# Patient Record
Sex: FEMALE | Race: WHITE | NOT HISPANIC OR LATINO
[De-identification: names, ages, dates, MRNs, and addresses within clinical notes are randomized per-mention and may not be internally consistent; named-entity substitution may affect disease eponyms.]

---

## 2017-08-22 ENCOUNTER — RESULT REVIEW (OUTPATIENT)
Age: 42
End: 2017-08-22

## 2018-10-26 ENCOUNTER — RESULT REVIEW (OUTPATIENT)
Age: 43
End: 2018-10-26

## 2020-07-15 ENCOUNTER — RESULT REVIEW (OUTPATIENT)
Age: 45
End: 2020-07-15

## 2020-09-08 ENCOUNTER — RESULT REVIEW (OUTPATIENT)
Age: 45
End: 2020-09-08

## 2022-05-25 ENCOUNTER — APPOINTMENT (OUTPATIENT)
Dept: ORTHOPEDIC SURGERY | Facility: CLINIC | Age: 47
End: 2022-05-25
Payer: COMMERCIAL

## 2022-05-25 DIAGNOSIS — S82.65XB: ICD-10-CM

## 2022-05-25 DIAGNOSIS — J45.909 UNSPECIFIED ASTHMA, UNCOMPLICATED: ICD-10-CM

## 2022-05-25 DIAGNOSIS — S82.851A DISPLACED TRIMALLEOLAR FRACTURE OF RIGHT LOWER LEG, INITIAL ENCOUNTER FOR CLOSED FRACTURE: ICD-10-CM

## 2022-05-25 PROBLEM — Z00.00 ENCOUNTER FOR PREVENTIVE HEALTH EXAMINATION: Status: ACTIVE | Noted: 2022-05-25

## 2022-05-25 PROCEDURE — L4361: CPT

## 2022-05-25 PROCEDURE — 99215 OFFICE O/P EST HI 40 MIN: CPT

## 2022-05-25 PROCEDURE — 99205 OFFICE O/P NEW HI 60 MIN: CPT

## 2022-05-25 PROCEDURE — 73610 X-RAY EXAM OF ANKLE: CPT | Mod: LT

## 2022-05-25 NOTE — HISTORY OF PRESENT ILLNESS
[Sudden] : sudden [7] : 7 [3] : 3 [Dull/Aching] : dull/aching [Localized] : localized [de-identified] : Pt is a 46 year old F who presents today for evaluation of her right ankle.  She reports falling on a curb on 5/23/22.  She went to [facility] where she underwent closed reduction of a right trimalleolar fracture (based on report). She has been nwb in a splint since. She had a prior fracture of the right ankle as a teenager which healed uneventfully. She states she injured the left ankle the same day.  It was diagnosed with a  sprain for which she is using a stirrup brace.\par  [] : Post Surgical Visit: no [FreeTextEntry1] : MATHEUS ankle [FreeTextEntry3] : 5/23/22 [FreeTextEntry5] : pt is a 45 y/o fem in for eval of MATHEUS Ankles pt states GEORGI was falling off a curb on above mentioned date Pt states Prior TX at [facility]  [de-identified] : 3/23/22 [de-identified] : [facility] [de-identified] : XR  [de-identified] : Immobilization \par Relocation

## 2022-05-25 NOTE — PHYSICAL EXAM
[2+] : posterior tibialis pulse: 2+ [Normal] : saphenous nerve sensation normal [Moderate] : moderate swelling of lateral ankle [Mild] : mild swelling of medial ankle [Left] : left ankle [Lateral malleolus fracture] : Lateral malleolus fracture [Right] : right ankle [Trimalleolar fracture] : Trimalleolar fracture [] : no mid foot instability [FreeTextEntry9] : Well reduced trimalleolar ankle fracture. [de-identified] : plantar flexion 30 degrees [de-identified] : inversion 10 degrees [de-identified] : eversion 10 degrees [TWNoteComboBox7] : dorsiflexion 10 degrees

## 2022-05-25 NOTE — ASSESSMENT
[FreeTextEntry1] : ORIF is recommended for the right ankle fracture as it is an unstable pattern.\par Patient is fitted with a cam walker for the left ankle.\par She is nwb on the right and wbat on the left.\par \par The risks and benefits of surgery have been discussed. Risks include but are not limited to bleeding, infection, reaction to anesthesia, injury to blood vessels and nerves, malunion, nonunion, necessity of repeat surgery, chronic pain, loss of limb and death. The patient understands the risks and agrees with the surgical plan. Details of the procedure and postoperative protocol were discussed at length. All questions have been answered.\par

## 2022-05-27 ENCOUNTER — LABORATORY RESULT (OUTPATIENT)
Age: 47
End: 2022-05-27

## 2022-05-30 RX ORDER — HYDROCODONE BITARTRATE AND ACETAMINOPHEN 7.5; 325 MG/1; MG/1
7.5-325 TABLET ORAL
Qty: 42 | Refills: 0 | Status: ACTIVE | COMMUNITY
Start: 2022-05-30 | End: 1900-01-01

## 2022-05-31 ENCOUNTER — APPOINTMENT (OUTPATIENT)
Age: 47
End: 2022-05-31
Payer: COMMERCIAL

## 2022-05-31 PROCEDURE — 29515 APPLICATION SHORT LEG SPLINT: CPT | Mod: 59,RT

## 2022-05-31 PROCEDURE — 27827 TREAT LOWER LEG FRACTURE: CPT | Mod: RT

## 2022-05-31 PROCEDURE — 73610 X-RAY EXAM OF ANKLE: CPT | Mod: 26,RT

## 2022-05-31 PROCEDURE — 27792 TREATMENT OF ANKLE FRACTURE: CPT | Mod: 59,RT

## 2022-06-10 ENCOUNTER — APPOINTMENT (OUTPATIENT)
Dept: ORTHOPEDIC SURGERY | Facility: CLINIC | Age: 47
End: 2022-06-10
Payer: COMMERCIAL

## 2022-06-10 ENCOUNTER — TRANSCRIPTION ENCOUNTER (OUTPATIENT)
Age: 47
End: 2022-06-10

## 2022-06-10 PROCEDURE — 29405 APPL SHORT LEG CAST: CPT

## 2022-06-10 PROCEDURE — 73610 X-RAY EXAM OF ANKLE: CPT | Mod: 50

## 2022-06-10 PROCEDURE — 99024 POSTOP FOLLOW-UP VISIT: CPT

## 2022-06-10 NOTE — ASSESSMENT
[FreeTextEntry1] : Sutures removed.\par SLC applied on the RT, NWB status.\par \par A well padded cast was applied.  Patient was instructed on proper cast management including keeping it dry and not sticking anything down the cast.  Patient was told that if pain significantly increases or toes turn numb and/or blue and simple elevation does not allow symptoms to improve in a few minutes, to call the office immediately or go to the ER.  All questions were answered.\par \par Patient was instructed that they can not operate an automatic vehicle while wearing a CAM boot or cast on the right lower extremity. If operating a vehicle that requires use of a clutch, patient may not drive while wearing a CAM boot or cast on the left lower extremity.\par \par Can continue WBAT in CAM boot on the LT.\par Elevation encouraged.\par Continue aspirin as prescibred.\par \par Repeat x-ray will be performed at the next office visit (B/L ankles), OOP on the RT before XR.\par \par

## 2022-06-10 NOTE — PHYSICAL EXAM
[Moderate] : moderate diffused ankle swelling [2+] : posterior tibialis pulse: 2+ [Normal] : saphenous nerve sensation normal [Lateral malleolus fracture] : Lateral malleolus fracture [Right] : right ankle [No loss of surgical correlation. Bony alignment acceptable. Hardware in appropriate position] : No loss of surgical correlation. Bony alignment acceptable. Hardware in appropriate position [Left] : left ankle [The fracture is in acceptable alignment. There is progression in healing seen] : The fracture is in acceptable alignment. There is progression in healing seen [4___] : eversion 4[unfilled]/5 [] : no mid foot instability [FreeTextEntry9] : Nondisplaced Mckeon A  lateral malleolus fracture. [de-identified] : plantar flexion 30 degrees [de-identified] : inversion 10 degrees [de-identified] : eversion 5 degrees [TWNoteComboBox7] : dorsiflexion 5 degrees

## 2022-06-10 NOTE — HISTORY OF PRESENT ILLNESS
[Sudden] : sudden [7] : 7 [3] : 3 [Dull/Aching] : dull/aching [Localized] : localized [Household chores] : household chores [Leisure] : leisure [Social interactions] : social interactions [Rest] : rest [Sitting] : sitting [de-identified] : Pt. presents for f/u bilateral ankle injuries. She is s/p ORIF RT trimalleolar ankle fracture 5/31/22. NWB in splint on R side. Post-op pain well managed. No fevers, chills, sweats. She has been WBAT in CAM boot on L for lateral malleolus fracture. Taking aspirin as prescribed.  [] : This patient has had an injection before: no [FreeTextEntry1] : B/L Ankles [FreeTextEntry3] : 5/23/22 [FreeTextEntry5] : pt is a 45 y/o fem in for eval of MATHEUS Ankles pt states GEORGI was falling off a curb on above mentioned date Pt states Prior TX at [facility]  [de-identified] : activity [de-identified] : 3/23/22 [de-identified] : [facility] [de-identified] : XR  [de-identified] : Immobilization \par Relocation  [de-identified] : 5/31/22 [de-identified] : [facility] ORIF R ankle

## 2022-07-06 ENCOUNTER — APPOINTMENT (OUTPATIENT)
Dept: ORTHOPEDIC SURGERY | Facility: CLINIC | Age: 47
End: 2022-07-06

## 2022-07-06 VITALS — WEIGHT: 250 LBS | BODY MASS INDEX: 39.24 KG/M2 | HEIGHT: 67 IN

## 2022-07-06 PROCEDURE — 99024 POSTOP FOLLOW-UP VISIT: CPT

## 2022-07-06 PROCEDURE — L1902: CPT

## 2022-07-06 PROCEDURE — 73610 X-RAY EXAM OF ANKLE: CPT | Mod: 50

## 2022-07-06 NOTE — ASSESSMENT
[FreeTextEntry1] : SLC removed.\par WBAT on LT with supportive sneaker/ASO brace.\par PWB in CAM boot on the RT for next week and then can transition to WBAT in boot.\par \par Patient was instructed that they can not operate an automatic vehicle while wearing a CAM boot or cast on the right lower extremity. If operating a vehicle that requires use of a clutch, patient may not drive while wearing a CAM boot or cast on the left lower extremity.\par \par Ice to affected area.\par Begin PT for both sides. \par \par Repeat x-ray will be performed at the next office visit (RT ankle 3V).\par \par

## 2022-07-06 NOTE — PHYSICAL EXAM
[2+] : posterior tibialis pulse: 2+ [Normal] : saphenous nerve sensation normal [Lateral malleolus fracture] : Lateral malleolus fracture [Right] : right ankle [No loss of surgical correlation. Bony alignment acceptable. Hardware in appropriate position] : No loss of surgical correlation. Bony alignment acceptable. Hardware in appropriate position [The fracture is in acceptable alignment. There is progression in healing seen] : The fracture is in acceptable alignment. There is progression in healing seen [Left] : left ankle [Mild] : mild swelling of dorsal foot [4___] : eversion 4[unfilled]/5 [5___] : [facility] 5[unfilled]/5 [NL (40)] : plantar flexion 40 degrees [] : no mid foot instability [FreeTextEntry9] : Healed lateral malleolus fracture.  [de-identified] : plantar flexion 30 degrees [de-identified] : inversion 15 degrees [de-identified] : eversion 5 degrees [TWNoteComboBox7] : dorsiflexion 5 degrees

## 2022-07-06 NOTE — HISTORY OF PRESENT ILLNESS
[Sudden] : sudden [7] : 7 [3] : 3 [Dull/Aching] : dull/aching [Localized] : localized [Household chores] : household chores [Leisure] : leisure [Social interactions] : social interactions [Rest] : rest [Sitting] : sitting [de-identified] : Pt. presents for f/u bilateral ankle injuries. She is s/p ORIF RT trimalleolar ankle fracture 5/31/22. NWB in SLC on RT. She has been WBAT in CAM boot on L for lateral malleolus fracture. Taking aspirin as prescribed. She reports doing well.  [] : This patient has had an injection before: no [FreeTextEntry1] : B/L Ankles [FreeTextEntry3] : 5/23/22 [FreeTextEntry5] : pt is a 45 y/o fem in for eval of MATHEUS Ankles pt states GEORGI was falling off a curb on above mentioned date Pt states Prior TX at [facility]  [de-identified] : activity [de-identified] : 3/23/22 [de-identified] : [facility] [de-identified] : XR  [de-identified] : Immobilization \par Relocation  [de-identified] : 5/31/22 [de-identified] : [facility] ORIF R ankle

## 2022-08-01 ENCOUNTER — APPOINTMENT (OUTPATIENT)
Dept: ORTHOPEDIC SURGERY | Facility: CLINIC | Age: 47
End: 2022-08-01

## 2022-08-01 VITALS — WEIGHT: 250 LBS | BODY MASS INDEX: 39.24 KG/M2 | HEIGHT: 67 IN

## 2022-08-01 PROCEDURE — 99024 POSTOP FOLLOW-UP VISIT: CPT

## 2022-08-01 PROCEDURE — 73610 X-RAY EXAM OF ANKLE: CPT | Mod: RT

## 2022-08-01 NOTE — PHYSICAL EXAM
[Mild] : mild diffused ankle swelling [4___] : eversion 4[unfilled]/5 [2+] : posterior tibialis pulse: 2+ [Normal] : saphenous nerve sensation normal [Lateral malleolus fracture] : Lateral malleolus fracture [Right] : right ankle [No loss of surgical correlation. Bony alignment acceptable. Hardware in appropriate position] : No loss of surgical correlation. Bony alignment acceptable. Hardware in appropriate position [Left] : left ankle [5___] : eversion 5[unfilled]/5 [] : no mid foot instability [FreeTextEntry3] : Minimal lateral ankle swelling.  [FreeTextEntry9] : Fractures are healed.  [de-identified] : plantar flexion 30 degrees [de-identified] : inversion 10 degrees [de-identified] : eversion 10 degrees [TWNoteComboBox7] : N

## 2022-08-01 NOTE — HISTORY OF PRESENT ILLNESS
[Sudden] : sudden [7] : 7 [3] : 3 [Dull/Aching] : dull/aching [Localized] : localized [Household chores] : household chores [Leisure] : leisure [Social interactions] : social interactions [Rest] : rest [Sitting] : sitting [de-identified] : Pt. presents for f/u bilateral ankle injuries. She is s/p ORIF RT trimalleolar ankle fracture 5/31/22. WBAT in CAM boot on the RT and ASO brace on the LT, using a walker. Doing PT. She reports continued improvement.  [] : This patient has had an injection before: no [FreeTextEntry1] : B/L Ankles [FreeTextEntry3] : 5/23/22 [FreeTextEntry5] : pt is a 47 y/o fem in for eval of MATHEUS Ankles pt states GEORGI was falling off a curb on above mentioned date Pt states Prior TX at [facility]  [de-identified] : activity [de-identified] : 3/23/22 [de-identified] : [facility] [de-identified] : XR  [de-identified] : Immobilization \par Relocation  [de-identified] : 5/31/22 [de-identified] : [facility] ORIF R ankle

## 2022-08-01 NOTE — ASSESSMENT
[FreeTextEntry1] : WBAT in supportive footwear, ASO brace on the RT.\par Continue PT.\par Ice to affected area. \par

## 2022-09-16 ENCOUNTER — APPOINTMENT (OUTPATIENT)
Dept: ORTHOPEDIC SURGERY | Facility: CLINIC | Age: 47
End: 2022-09-16

## 2022-09-16 PROCEDURE — 99213 OFFICE O/P EST LOW 20 MIN: CPT

## 2022-09-16 NOTE — PHYSICAL EXAM
[Mild] : mild diffused ankle swelling [4___] : plantar flexion 4[unfilled]/5 [Left] : left foot and ankle [2+] : posterior tibialis pulse: 2+ [Normal] : saphenous nerve sensation normal [Right] : right ankle [No loss of surgical correlation. Bony alignment acceptable. Hardware in appropriate position] : No loss of surgical correlation. Bony alignment acceptable. Hardware in appropriate position [5___] : eversion 5[unfilled]/5 [NL (40)] : plantar flexion 40 degrees [] : no mid foot instability [FreeTextEntry3] : Minimal lateral ankle swelling.  [FreeTextEntry9] : Fractures are healed.  [de-identified] : plantar flexion 30 degrees [de-identified] : inversion 15 degrees [de-identified] : eversion 10 degrees [TWNoteComboBox7] : dorsiflexion -5 degrees

## 2022-09-16 NOTE — HISTORY OF PRESENT ILLNESS
[Sudden] : sudden [Dull/Aching] : dull/aching [Localized] : localized [Household chores] : household chores [Leisure] : leisure [Social interactions] : social interactions [Rest] : rest [Sitting] : sitting [4] : 4 [2] : 2 [Standing] : standing [Walking] : walking [Stairs] : stairs [de-identified] : Pt. presents for f/u for her bilateral ankle injuries. She is s/p ORIF RT trimalleolar ankle fracture 5/31/22, and s/p closed treatment of the left lateral malleolus fracture.  She is WBAT in sneakers and is no longer going to PT.  She reports improvement, but still has some swelling/pain with prolonged standing/walking. [] : This patient has had an injection before: no [FreeTextEntry1] : B/L Ankles [FreeTextEntry3] : 5/23/22 [FreeTextEntry5] : pt is a 47 y/o fem in for eval of MATHEUS Ankles pt states GEORGI was falling off a curb on above mentioned date Pt states Prior TX at [facility]  [de-identified] : activity [de-identified] : 3/23/22 [de-identified] : [facility] [de-identified] : XR  [de-identified] : Immobilization \par Relocation  [de-identified] : 5/31/22 [de-identified] : [facility] ORIF R ankle

## 2022-09-16 NOTE — ASSESSMENT
[FreeTextEntry1] : Patient is doing well overall.  SHe will continue with PT and home exercises.\par Icing/nsaids prn.\par

## 2022-11-02 ENCOUNTER — APPOINTMENT (OUTPATIENT)
Dept: ORTHOPEDIC SURGERY | Facility: CLINIC | Age: 47
End: 2022-11-02

## 2022-11-02 DIAGNOSIS — R29.898 OTHER SYMPTOMS AND SIGNS INVOLVING THE MUSCULOSKELETAL SYSTEM: ICD-10-CM

## 2022-11-02 PROCEDURE — 99213 OFFICE O/P EST LOW 20 MIN: CPT

## 2022-11-02 NOTE — PHYSICAL EXAM
[Mild] : mild diffused ankle swelling [Left] : left foot and ankle [NL (40)] : plantar flexion 40 degrees [2+] : posterior tibialis pulse: 2+ [Normal] : saphenous nerve sensation normal [Right] : right ankle [No loss of surgical correlation. Bony alignment acceptable. Hardware in appropriate position] : No loss of surgical correlation. Bony alignment acceptable. Hardware in appropriate position [FreeTextEntry9] : Fractures are healed.  [5___] : plantar flexion 5[unfilled]/5 [] : patient ambulates without assistive device [FreeTextEntry3] : Minimal lateral ankle swelling.  [de-identified] : plantar flexion 30 degrees [de-identified] : inversion 20 degrees [de-identified] : eversion 10 degrees [TWNoteComboBox7] : dorsiflexion 5 degrees

## 2022-11-02 NOTE — ASSESSMENT
[FreeTextEntry1] : Pt. continues to improve but there is still significant stiffness and some weakness that wound benefit from formal PT.\par \par WBAT in supportive footwear is recommended.\par Ice to affected area.\par \par RTO 6 weeks.

## 2022-11-02 NOTE — HISTORY OF PRESENT ILLNESS
[Sudden] : sudden [Dull/Aching] : dull/aching [Localized] : localized [Household chores] : household chores [Leisure] : leisure [Social interactions] : social interactions [Rest] : rest [Sitting] : sitting [Standing] : standing [Walking] : walking [Stairs] : stairs [3] : 3 [1] : 2 [de-identified] : Pt. presents for f/u for her bilateral ankle injuries. She is s/p ORIF RT trimalleolar ankle fracture 5/31/22, and s/p closed treatment of the left lateral malleolus fracture.  She is WBAT in sneakers and is doing PT which is very helpful. She reports improvement and has been increasing her activities. Doing pilates and chiro care for her hip.  [] : This patient has had an injection before: no [FreeTextEntry1] : B/L Ankles [FreeTextEntry3] : 5/23/22 [FreeTextEntry5] : pt is a 47 y/o fem in for eval of MATHEUS Ankles pt states GEORGI was falling off a curb on above mentioned date Pt states Prior TX at [facility]  [de-identified] : activity [de-identified] : 3/23/22 [de-identified] : [facility] [de-identified] : XR  [de-identified] : Immobilization \par Relocation  [de-identified] : 5/31/22 [de-identified] : [facility] ORIF R ankle

## 2022-12-09 ENCOUNTER — APPOINTMENT (OUTPATIENT)
Dept: ORTHOPEDIC SURGERY | Facility: CLINIC | Age: 47
End: 2022-12-09
Payer: COMMERCIAL

## 2022-12-09 PROCEDURE — 99213 OFFICE O/P EST LOW 20 MIN: CPT

## 2022-12-09 PROCEDURE — 99214 OFFICE O/P EST MOD 30 MIN: CPT

## 2022-12-09 NOTE — HISTORY OF PRESENT ILLNESS
[Sudden] : sudden [4] : 4 [5] : 5 [Dull/Aching] : dull/aching [Localized] : localized [Household chores] : household chores [Leisure] : leisure [Social interactions] : social interactions [Rest] : rest [Sitting] : sitting [Standing] : standing [Walking] : walking [Stairs] : stairs [de-identified] : Pt. presents for f/u for her bilateral ankle injuries. She is s/p ORIF RT trimalleolar ankle fracture 5/31/22, and s/p closed treatment of the left lateral malleolus fracture.  She is WBAT in sneakers and is doing home exercises.  She is doing more exercise, but feels she still has pain after prolonged activity. [] : This patient has had an injection before: no [FreeTextEntry1] : B/L Ankles [FreeTextEntry3] : 5/23/22 [FreeTextEntry5] : pt is a 45 y/o fem in for eval of MATHEUS Ankles pt states GEORGI was falling off a curb on above mentioned date Pt states Prior TX at [facility]  [de-identified] : activity [de-identified] : 3/23/22 [de-identified] : [facility] [de-identified] : XR  [de-identified] : home exercise  [de-identified] : 5/31/22 [de-identified] : [facility] ORIF R ankle

## 2022-12-09 NOTE — ASSESSMENT
[FreeTextEntry1] : Patient is recommended to go back to PT as her ROM (especially on the right) has regressed.\par Compression sleeves for swelling is recommended.\par \par Ice/nsaids\par

## 2023-01-20 ENCOUNTER — APPOINTMENT (OUTPATIENT)
Dept: ORTHOPEDIC SURGERY | Facility: CLINIC | Age: 48
End: 2023-01-20
Payer: COMMERCIAL

## 2023-01-20 DIAGNOSIS — S82.65XD NONDISPLACED FRACTURE OF LATERAL MALLEOLUS OF LEFT FIBULA, SUBSEQUENT ENCOUNTER FOR CLOSED FRACTURE WITH ROUTINE HEALING: ICD-10-CM

## 2023-01-20 DIAGNOSIS — Z87.81 OTHER SPECIFIED POSTPROCEDURAL STATES: ICD-10-CM

## 2023-01-20 DIAGNOSIS — M25.672 STIFFNESS OF LEFT ANKLE, NOT ELSEWHERE CLASSIFIED: ICD-10-CM

## 2023-01-20 DIAGNOSIS — S82.851D DISPLACED TRIMALLEOLAR FRACTURE OF RIGHT LOWER LEG, SUBSEQUENT ENCOUNTER FOR CLOSED FRACTURE WITH ROUTINE HEALING: ICD-10-CM

## 2023-01-20 DIAGNOSIS — M25.671 STIFFNESS OF RIGHT ANKLE, NOT ELSEWHERE CLASSIFIED: ICD-10-CM

## 2023-01-20 DIAGNOSIS — Z98.890 OTHER SPECIFIED POSTPROCEDURAL STATES: ICD-10-CM

## 2023-01-20 PROCEDURE — 99213 OFFICE O/P EST LOW 20 MIN: CPT

## 2023-01-20 NOTE — ASSESSMENT
[FreeTextEntry1] : Patient doing much better. \par \par WB in supportive footwear.\par Ice to affected area. \par She will continue with ROM exercises.\par \par If any issues with either ankle, return to office.

## 2023-01-20 NOTE — HISTORY OF PRESENT ILLNESS
[Sudden] : sudden [4] : 4 [5] : 5 [Dull/Aching] : dull/aching [Localized] : localized [Household chores] : household chores [Leisure] : leisure [Social interactions] : social interactions [Rest] : rest [Sitting] : sitting [Standing] : standing [Walking] : walking [Stairs] : stairs [de-identified] : Pt. presents for f/u for her bilateral ankle injuries. She is s/p ORIF right trimalleolar ankle fracture 5/31/22, and s/p closed treatment of the left lateral malleolus fracture.  She is WBAT in sneakers and is doing PT/acupuncture. She reports improvement compared to last visit.   [] : This patient has had an injection before: no [FreeTextEntry1] : B/L Ankles [FreeTextEntry3] : 5/23/22 [FreeTextEntry5] : pt is a 47 y/o fem in for eval of MATHEUS Ankles pt states GEORGI was falling off a curb on above mentioned date Pt states Prior TX at [facility]  [de-identified] : activity [de-identified] : 3/23/22 [de-identified] : [facility] [de-identified] : XR  [de-identified] : home exercise  [de-identified] : 5/31/22 [de-identified] : [facility] ORIF R ankle

## 2023-01-20 NOTE — PHYSICAL EXAM
[Right] : right foot and ankle [Moderate] : moderate diffused ankle swelling [Left] : left foot and ankle [NL (40)] : plantar flexion 40 degrees [5___] : eversion 5[unfilled]/5 [2+] : posterior tibialis pulse: 2+ [Normal] : saphenous nerve sensation normal [] : mildly antalgic [FreeTextEntry3] : Minimal lateral ankle swelling.  [de-identified] : plantar flexion 30 degrees [de-identified] : inversion 15 degrees [de-identified] : eversion 15 degrees [TWNoteComboBox7] : dorsiflexion 0 degrees
